# Patient Record
Sex: FEMALE | Race: WHITE | NOT HISPANIC OR LATINO
[De-identification: names, ages, dates, MRNs, and addresses within clinical notes are randomized per-mention and may not be internally consistent; named-entity substitution may affect disease eponyms.]

---

## 2018-07-02 ENCOUNTER — TRANSCRIPTION ENCOUNTER (OUTPATIENT)
Age: 53
End: 2018-07-02

## 2020-08-05 VITALS — BODY MASS INDEX: 26.68 KG/M2 | WEIGHT: 170 LBS | HEIGHT: 67 IN

## 2020-08-05 PROBLEM — Z00.00 ENCOUNTER FOR PREVENTIVE HEALTH EXAMINATION: Status: ACTIVE | Noted: 2020-08-05

## 2020-08-05 NOTE — HISTORY OF PRESENT ILLNESS
[TextBox_13] : Referred by Rose Mary Anderson NP.\par \par Ms. BARNEY is a 55 year old female with a history of squamous cell skin CA of chest.\par \par She was called today to review eligibility for Low-Dose CT lung cancer screening.  Reviewed and confirmed that the patient meets screening eligibility criteria:\par \par 55 years old \par \par Smoking Status: Former smoker\par \par Number of pack(s) per day: 18 cigarettes daily\par Number of years smoked: 36\par Number of pack years smokin.5\par \par Ms. BARNEY denies any symptoms of lung cancer, including new cough, change in cough, hemoptysis, and unintentional weight loss.\par \par Ms. BARNEY denies any personal history of lung cancer.  No lung cancer in a first degree relative.  Denies any history of lung disease or any history of occupational exposures.

## 2020-08-05 NOTE — PLAN
[[facility]] : referred to [place] [facility] [phone] [FreeTextEntry1] : - Low Dose CT chest for lung cancer screening.\par \par - Encouraged smoking cessation\par \par

## 2020-08-12 ENCOUNTER — APPOINTMENT (OUTPATIENT)
Dept: CT IMAGING | Facility: CLINIC | Age: 55
End: 2020-08-12
Payer: COMMERCIAL

## 2020-08-12 PROCEDURE — G0297: CPT

## 2020-08-14 ENCOUNTER — APPOINTMENT (OUTPATIENT)
Dept: RADIOLOGY | Facility: CLINIC | Age: 55
End: 2020-08-14
Payer: COMMERCIAL

## 2020-08-14 PROCEDURE — 77080 DXA BONE DENSITY AXIAL: CPT

## 2020-08-15 ENCOUNTER — TRANSCRIPTION ENCOUNTER (OUTPATIENT)
Age: 55
End: 2020-08-15

## 2021-08-11 ENCOUNTER — APPOINTMENT (OUTPATIENT)
Dept: CARDIOLOGY | Facility: CLINIC | Age: 56
End: 2021-08-11

## 2021-08-17 ENCOUNTER — OUTPATIENT (OUTPATIENT)
Dept: OUTPATIENT SERVICES | Facility: HOSPITAL | Age: 56
LOS: 1 days | End: 2021-08-17
Payer: COMMERCIAL

## 2021-08-17 DIAGNOSIS — R79.89 OTHER SPECIFIED ABNORMAL FINDINGS OF BLOOD CHEMISTRY: ICD-10-CM

## 2021-08-18 ENCOUNTER — NON-APPOINTMENT (OUTPATIENT)
Age: 56
End: 2021-08-18

## 2021-08-18 ENCOUNTER — RESULT REVIEW (OUTPATIENT)
Age: 56
End: 2021-08-18

## 2021-08-18 ENCOUNTER — OUTPATIENT (OUTPATIENT)
Dept: OUTPATIENT SERVICES | Facility: HOSPITAL | Age: 56
LOS: 1 days | End: 2021-08-18

## 2021-08-18 ENCOUNTER — APPOINTMENT (OUTPATIENT)
Dept: HEMATOLOGY ONCOLOGY | Facility: CLINIC | Age: 56
End: 2021-08-18
Payer: COMMERCIAL

## 2021-08-18 ENCOUNTER — OUTPATIENT (OUTPATIENT)
Dept: OUTPATIENT SERVICES | Facility: HOSPITAL | Age: 56
LOS: 1 days | End: 2021-08-18
Payer: COMMERCIAL

## 2021-08-18 VITALS
HEART RATE: 86 BPM | WEIGHT: 174 LBS | OXYGEN SATURATION: 97 % | HEIGHT: 66 IN | DIASTOLIC BLOOD PRESSURE: 83 MMHG | TEMPERATURE: 98.2 F | BODY MASS INDEX: 27.97 KG/M2 | SYSTOLIC BLOOD PRESSURE: 119 MMHG

## 2021-08-18 DIAGNOSIS — R91.1 SOLITARY PULMONARY NODULE: ICD-10-CM

## 2021-08-18 DIAGNOSIS — R79.89 OTHER SPECIFIED ABNORMAL FINDINGS OF BLOOD CHEMISTRY: ICD-10-CM

## 2021-08-18 DIAGNOSIS — Z78.9 OTHER SPECIFIED HEALTH STATUS: ICD-10-CM

## 2021-08-18 DIAGNOSIS — M85.80 OTHER SPECIFIED DISORDERS OF BONE DENSITY AND STRUCTURE, UNSPECIFIED SITE: ICD-10-CM

## 2021-08-18 DIAGNOSIS — E53.8 DEFICIENCY OF OTHER SPECIFIED B GROUP VITAMINS: ICD-10-CM

## 2021-08-18 DIAGNOSIS — D58.2 OTHER HEMOGLOBINOPATHIES: ICD-10-CM

## 2021-08-18 DIAGNOSIS — Z80.51 FAMILY HISTORY OF MALIGNANT NEOPLASM OF KIDNEY: ICD-10-CM

## 2021-08-18 DIAGNOSIS — C44.92 SQUAMOUS CELL CARCINOMA OF SKIN, UNSPECIFIED: ICD-10-CM

## 2021-08-18 DIAGNOSIS — Z86.79 PERSONAL HISTORY OF OTHER DISEASES OF THE CIRCULATORY SYSTEM: ICD-10-CM

## 2021-08-18 LAB
BASOPHILS # BLD AUTO: 0.05 K/UL — SIGNIFICANT CHANGE UP (ref 0–0.2)
BASOPHILS NFR BLD AUTO: 0.6 % — SIGNIFICANT CHANGE UP (ref 0–2)
EOSINOPHIL # BLD AUTO: 0.08 K/UL — SIGNIFICANT CHANGE UP (ref 0–0.5)
EOSINOPHIL NFR BLD AUTO: 1 % — SIGNIFICANT CHANGE UP (ref 0–6)
HCT VFR BLD CALC: 48.5 % — HIGH (ref 34.5–45)
HGB BLD-MCNC: 16.3 G/DL — HIGH (ref 11.5–15.5)
IMM GRANULOCYTES NFR BLD AUTO: 0.1 % — SIGNIFICANT CHANGE UP (ref 0–1.5)
LYMPHOCYTES # BLD AUTO: 2.64 K/UL — SIGNIFICANT CHANGE UP (ref 1–3.3)
LYMPHOCYTES # BLD AUTO: 33.1 % — SIGNIFICANT CHANGE UP (ref 13–44)
MCHC RBC-ENTMCNC: 33.2 PG — SIGNIFICANT CHANGE UP (ref 27–34)
MCHC RBC-ENTMCNC: 33.6 GM/DL — SIGNIFICANT CHANGE UP (ref 32–36)
MCV RBC AUTO: 98.8 FL — SIGNIFICANT CHANGE UP (ref 80–100)
MONOCYTES # BLD AUTO: 0.56 K/UL — SIGNIFICANT CHANGE UP (ref 0–0.9)
MONOCYTES NFR BLD AUTO: 7 % — SIGNIFICANT CHANGE UP (ref 2–14)
NEUTROPHILS # BLD AUTO: 4.63 K/UL — SIGNIFICANT CHANGE UP (ref 1.8–7.4)
NEUTROPHILS NFR BLD AUTO: 58.2 % — SIGNIFICANT CHANGE UP (ref 43–77)
NRBC # BLD: 0 /100 WBCS — SIGNIFICANT CHANGE UP (ref 0–0)
PLATELET # BLD AUTO: 199 K/UL — SIGNIFICANT CHANGE UP (ref 150–400)
RBC # BLD: 4.91 M/UL — SIGNIFICANT CHANGE UP (ref 3.8–5.2)
RBC # FLD: 12.7 % — SIGNIFICANT CHANGE UP (ref 10.3–14.5)
WBC # BLD: 7.97 K/UL — SIGNIFICANT CHANGE UP (ref 3.8–10.5)
WBC # FLD AUTO: 7.97 K/UL — SIGNIFICANT CHANGE UP (ref 3.8–10.5)

## 2021-08-18 PROCEDURE — 85027 COMPLETE CBC AUTOMATED: CPT

## 2021-08-18 PROCEDURE — 82668 ASSAY OF ERYTHROPOIETIN: CPT

## 2021-08-18 PROCEDURE — 81270 JAK2 GENE: CPT

## 2021-08-18 PROCEDURE — 99203 OFFICE O/P NEW LOW 30 MIN: CPT

## 2021-08-18 PROCEDURE — 83930 ASSAY OF BLOOD OSMOLALITY: CPT

## 2021-08-18 PROCEDURE — G0452: CPT | Mod: 26

## 2021-08-18 RX ORDER — OMEGA-3/DHA/EPA/FISH OIL 300-1000MG
CAPSULE ORAL
Refills: 0 | Status: ACTIVE | COMMUNITY

## 2021-08-19 ENCOUNTER — NON-APPOINTMENT (OUTPATIENT)
Age: 56
End: 2021-08-19

## 2021-08-19 LAB
EPO SERPL-MCNC: 9.5 MIU/ML — SIGNIFICANT CHANGE UP (ref 2.6–18.5)
OSMOLALITY SERPL: 288 MOSMOL/KG — SIGNIFICANT CHANGE UP (ref 275–300)

## 2021-08-20 ENCOUNTER — NON-APPOINTMENT (OUTPATIENT)
Age: 56
End: 2021-08-20

## 2021-08-20 LAB — JAK2 P.V617F BLD/T QL: SIGNIFICANT CHANGE UP

## 2021-08-23 ENCOUNTER — NON-APPOINTMENT (OUTPATIENT)
Age: 56
End: 2021-08-23

## 2021-08-23 ENCOUNTER — APPOINTMENT (OUTPATIENT)
Dept: CARDIOLOGY | Facility: CLINIC | Age: 56
End: 2021-08-23
Payer: COMMERCIAL

## 2021-08-23 VITALS — SYSTOLIC BLOOD PRESSURE: 134 MMHG | DIASTOLIC BLOOD PRESSURE: 84 MMHG

## 2021-08-23 VITALS
OXYGEN SATURATION: 99 % | WEIGHT: 173 LBS | HEART RATE: 92 BPM | BODY MASS INDEX: 27.8 KG/M2 | SYSTOLIC BLOOD PRESSURE: 136 MMHG | DIASTOLIC BLOOD PRESSURE: 84 MMHG | HEIGHT: 66 IN | TEMPERATURE: 98.4 F

## 2021-08-23 PROCEDURE — 99244 OFF/OP CNSLTJ NEW/EST MOD 40: CPT

## 2021-08-23 PROCEDURE — 93000 ELECTROCARDIOGRAM COMPLETE: CPT

## 2021-08-25 ENCOUNTER — APPOINTMENT (OUTPATIENT)
Dept: ULTRASOUND IMAGING | Facility: CLINIC | Age: 56
End: 2021-08-25
Payer: COMMERCIAL

## 2021-08-25 PROCEDURE — 76700 US EXAM ABDOM COMPLETE: CPT

## 2021-08-28 NOTE — ASSESSMENT
[FreeTextEntry1] : Smoking cessation has been emphasized \par echocardiogram to evaluate for hypertensive heart disease \par when we obtain blood pressure goals will perform exercise test  \par add amlodipine \par take lisinopril in the morning amlodipine in the evening \par return with blood pressure monitorand log low-sodium diet \par \par Discussed the diagnosis, natural history and pathophysiology of HTN.\par Discussed importance of long term BP control to reduce CV risk\par Reviewed recent guidelines and BP goals\par Echocardiogram to evaluate for hypertensive heart disease\par Stress test to evaluate CV response to exercise\par Discussed indications for pharmacotherapy.\par Instructed to call if adverse effects\par Lifestyle measures.\par Adjunctive dietary measures: regular aerobic exercise, low-sodium diet, limit NSAIDs\par Monitor blood pressure at home.   Return with blood pressure monitor and log.  Call if abnormal BP readings\par Bloodwork requested\par \par Note: Findings of cardiac involvement in hemochromatosis would be initial impairment of diastolic dysfunction with atrial enlargement then possible hypertrophy. Leads to a dilated cardiomyopathy characterized by the development of heart failure and conduction disturbances due to excess deposition of iron within the myocardium which can be detected by cardiovascular magnetic resonance. Treatment with phlebotomy or chelation therapy has been associated with reversal of the left ventricular dysfunction.\par

## 2021-08-28 NOTE — HISTORY OF PRESENT ILLNESS
[FreeTextEntry1] : ALLAN BARNEY  is a 56 year old  F\par \par there is a history of a heart murmur \par she has an allergy to sulfa \par she recently saw her primary physician \par she was noted to have elevated blood pressure \par she reports chronically having a history of asymptomatic hypertension \par she was started on lisinopril the dose was subsequently increased \par she smokes up to a pack a day \par she is interested in quitting \par she walks \par her family history is notable for father with cranial aneurysm \par she has started a plant-based diet\par \par There is no prior history of a clinical myocardial infarction, coronary revascularization. \par There is no history of symptomatic congestive heart failure rheumatic heart disease or valvular disease.\par There is no history of symptomatic arrhythmias including atrial fibrillation.\par \par There is no exertional chest pain, pressure or discomfort. \par There is no significant dyspnea on exertion or orthopnea. \par There are no symptomatic palpitations, lightheadedness, dizziness or syncope.\par \par

## 2021-09-02 ENCOUNTER — APPOINTMENT (OUTPATIENT)
Dept: CARDIOLOGY | Facility: CLINIC | Age: 56
End: 2021-09-02
Payer: COMMERCIAL

## 2021-09-02 PROBLEM — D58.2 ELEVATED HEMOGLOBIN: Status: ACTIVE | Noted: 2021-08-18

## 2021-09-02 PROCEDURE — 93356 MYOCRD STRAIN IMG SPCKL TRCK: CPT | Mod: 26

## 2021-09-02 PROCEDURE — 93306 TTE W/DOPPLER COMPLETE: CPT

## 2021-09-02 NOTE — REVIEW OF SYSTEMS
[Patient Intake Form Reviewed] : Patient intake form was reviewed [Fever] : no fever [Fatigue] : no fatigue [Recent Change In Weight] : ~T no recent weight change [Dysphagia] : no dysphagia [Odynophagia] : no odynophagia [Palpitations] : palpitations [Shortness Of Breath] : no shortness of breath [Abdominal Pain] : no abdominal pain [Joint Pain] : no joint pain [Joint Stiffness] : no joint stiffness [Skin Rash] : no skin rash [Fainting] : no fainting [Easy Bleeding] : no tendency for easy bleeding [Easy Bruising] : no tendency for easy bruising [Swollen Glands] : no swollen glands

## 2021-09-02 NOTE — RESULTS/DATA
[FreeTextEntry1] : Ms. 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

## 2021-09-02 NOTE — PHYSICAL EXAM
[Fully active, able to carry on all pre-disease performance without restriction] : Status 0 - Fully active, able to carry on all pre-disease performance without restriction [Normal] : affect appropriate [de-identified] : anicteric

## 2021-09-02 NOTE — CONSULT LETTER
[Dear  ___] : Dear  [unfilled], [Consult Letter:] : I had the pleasure of evaluating your patient, [unfilled]. [Please see my note below.] : Please see my note below. [Consult Closing:] : Thank you very much for allowing me to participate in the care of this patient.  If you have any questions, please do not hesitate to contact me. [Sincerely,] : Sincerely, [FreeTextEntry2] : Dr. Shawna Anthony [FreeTextEntry3] : Frank Yepez MD\par

## 2021-09-07 ENCOUNTER — APPOINTMENT (OUTPATIENT)
Dept: CARDIOLOGY | Facility: CLINIC | Age: 56
End: 2021-09-07
Payer: COMMERCIAL

## 2021-09-07 VITALS
TEMPERATURE: 97.7 F | BODY MASS INDEX: 27.97 KG/M2 | SYSTOLIC BLOOD PRESSURE: 110 MMHG | WEIGHT: 174 LBS | DIASTOLIC BLOOD PRESSURE: 80 MMHG | OXYGEN SATURATION: 97 % | HEART RATE: 84 BPM | HEIGHT: 66 IN

## 2021-09-07 DIAGNOSIS — F17.210 NICOTINE DEPENDENCE, CIGARETTES, UNCOMPLICATED: ICD-10-CM

## 2021-09-07 PROCEDURE — 99214 OFFICE O/P EST MOD 30 MIN: CPT

## 2021-09-07 NOTE — CARDIOLOGY SUMMARY
[de-identified] : 8/23/21 normal sinus rhythm with low voltage\par  [de-identified] : 9/2/21 EF 60-65%, mild MR, normal LV systolic function, normal PASP\par

## 2021-09-07 NOTE — ASSESSMENT
[FreeTextEntry1] : ALLAN BARNEY is a 56 year old F who presents today Sep 07, 2021 here to review cardiovascular test results. and BP management. \par \par HTN\par Discussed the diagnosis, natural history and pathophysiology of HTN.\par Discussed importance of long term BP control to reduce CV risk\par Echo shows no evidence of hypertensive heart disease. BP has much improved on current regimen. \par Cont to take lisinopril in the morning amlodipine in the evening \par Stress test to evaluate CV response to exercise\par Lifestyle measures.\par Adjunctive dietary measures: regular aerobic exercise, low-sodium diet, limit NSAIDs\par Monitor blood pressure at home.   Return with blood pressure monitor and log.  Call if abnormal BP readings\par \par Smoking cessation has been emphasized. Motivated to quit. Has received info from cessation counseling. \par \par Note: Findings of cardiac involvement in hemochromatosis would be initial impairment of diastolic dysfunction with atrial enlargement then possible hypertrophy. Leads to a dilated cardiomyopathy characterized by the development of heart failure and conduction disturbances due to excess deposition of iron within the myocardium which can be detected by cardiovascular magnetic resonance. Treatment with phlebotomy or chelation therapy has been associated with reversal of the left ventricular dysfunction.\par Cont to followup with hematology. \par \par Will call w/ stress test results and further recommendations. \par Any questions and concerns were addressed and resolved. \par \par Sincerely,\par \par AYAZ Silver\par Patients history, testing, and plan reviewed with supervising MD: Dr. Freddy Washington

## 2021-09-07 NOTE — ADDENDUM
[FreeTextEntry1] : Please note the patient was reviewed with NP Tracye Woods.\lexus I was physically present during the service of the patient.\lexus I was directly involved in the management plan and recommendations of the care provided to the patient. \par I personally reviewed the history and physical examination as documented by the NP above.\par Sep  7 2021 10:30AM\par

## 2021-09-07 NOTE — HISTORY OF PRESENT ILLNESS
[FreeTextEntry1] : ALLAN BARNEY  is a 56 year old  F here to review cardiovascular test results. \par \par there is a history of a heart murmur \par she has an allergy to sulfa \par she recently saw her primary physician \par she was noted to have elevated blood pressure \par she reports chronically having a history of asymptomatic hypertension \par she was started on lisinopril the dose was subsequently increased \par she smokes up to a pack a day \par she is interested in quitting \par she walks up to 40 min \par her family history is notable for father with cranial aneurysm \par she has started a plant-based diet\par \par There is no prior history of a clinical myocardial infarction, coronary revascularization. \par There is no history of symptomatic congestive heart failure rheumatic heart disease or valvular disease.\par There is no history of symptomatic arrhythmias including atrial fibrillation.\par \par There is no exertional chest pain, pressure or discomfort. \par There is no significant dyspnea on exertion or orthopnea. \par There are no symptomatic palpitations, lightheadedness, dizziness or syncope.\par \par Since last seen now on amlodipine takes 5mg at night. Takes lisinopril 20mg in morning. \par Home cuff reads a bit too high, with that in mind her readings have since been very well controlled. \par Today 110/80.\par No adverse effect noted. \par \par Labs , HDL 65\par \par

## 2021-09-14 ENCOUNTER — APPOINTMENT (OUTPATIENT)
Dept: DISASTER EMERGENCY | Facility: CLINIC | Age: 56
End: 2021-09-14

## 2021-09-14 DIAGNOSIS — Z01.818 ENCOUNTER FOR OTHER PREPROCEDURAL EXAMINATION: ICD-10-CM

## 2021-09-15 LAB — SARS-COV-2 N GENE NPH QL NAA+PROBE: NOT DETECTED

## 2021-09-17 ENCOUNTER — OUTPATIENT (OUTPATIENT)
Dept: OUTPATIENT SERVICES | Facility: HOSPITAL | Age: 56
LOS: 1 days | End: 2021-09-17

## 2021-09-30 ENCOUNTER — APPOINTMENT (OUTPATIENT)
Dept: CARDIOLOGY | Facility: CLINIC | Age: 56
End: 2021-09-30
Payer: COMMERCIAL

## 2021-09-30 PROCEDURE — 93242 EXT ECG>48HR<7D RECORDING: CPT

## 2021-09-30 PROCEDURE — 93015 CV STRESS TEST SUPVJ I&R: CPT

## 2021-10-28 ENCOUNTER — OUTPATIENT (OUTPATIENT)
Dept: OUTPATIENT SERVICES | Facility: HOSPITAL | Age: 56
LOS: 1 days | End: 2021-10-28

## 2021-10-28 DIAGNOSIS — R79.89 OTHER SPECIFIED ABNORMAL FINDINGS OF BLOOD CHEMISTRY: ICD-10-CM

## 2021-10-29 ENCOUNTER — APPOINTMENT (OUTPATIENT)
Dept: HEMATOLOGY ONCOLOGY | Facility: CLINIC | Age: 56
End: 2021-10-29

## 2021-11-05 PROCEDURE — 93244 EXT ECG>48HR<7D REV&INTERPJ: CPT

## 2021-11-12 ENCOUNTER — TRANSCRIPTION ENCOUNTER (OUTPATIENT)
Age: 56
End: 2021-11-12

## 2021-11-16 ENCOUNTER — APPOINTMENT (OUTPATIENT)
Dept: CARDIOLOGY | Facility: CLINIC | Age: 56
End: 2021-11-16
Payer: COMMERCIAL

## 2021-11-16 VITALS
HEART RATE: 99 BPM | SYSTOLIC BLOOD PRESSURE: 100 MMHG | BODY MASS INDEX: 28.28 KG/M2 | HEIGHT: 66 IN | WEIGHT: 176 LBS | DIASTOLIC BLOOD PRESSURE: 70 MMHG | OXYGEN SATURATION: 98 % | TEMPERATURE: 97.3 F

## 2021-11-16 DIAGNOSIS — Z14.8 GENETIC CARRIER OF OTHER DISEASE: ICD-10-CM

## 2021-11-16 DIAGNOSIS — I49.3 VENTRICULAR PREMATURE DEPOLARIZATION: ICD-10-CM

## 2021-11-16 PROCEDURE — 99214 OFFICE O/P EST MOD 30 MIN: CPT

## 2021-11-16 NOTE — ADDENDUM
[FreeTextEntry1] : Please note the patient was reviewed with NP Tracey Woods.\par I was physically present during the service of the patient.\lexus I was directly involved in the management plan and recommendations of the care provided to the patient. \par I personally reviewed the history and physical examination as documented by the NP above.\par Nov 16 2021 12:30PM\par

## 2021-11-16 NOTE — ASSESSMENT
[FreeTextEntry1] : ALLAN BARNEY is a 56 year old F who presents today Nov 16, 2021 here to review cardiovascular test results. \par \par HTN\par Echo shows no evidence of hypertensive heart disease. BP has much improved on current regimen. \par Cont to take lisinopril in the morning amlodipine in the evening - refilled today. \par Stress test shows normal CV response to exercise.\par Lifestyle measures.\par Adjunctive dietary measures: regular aerobic exercise, low-sodium diet, limit NSAIDs\par Monitor blood pressure at home, keep log. Call if abnormal BP readings\par \par Smoking cessation has been emphasized. Motivated to quit. Has received info from cessation counseling. \par \par Note: Findings of cardiac involvement in hemochromatosis would be initial impairment of diastolic dysfunction with atrial enlargement then possible hypertrophy. Leads to a dilated cardiomyopathy characterized by the development of heart failure and conduction disturbances due to excess deposition of iron within the myocardium which can be detected by cardiovascular magnetic resonance. Treatment with phlebotomy or chelation therapy has been associated with reversal of the left ventricular dysfunction.\par Cont to followup with hematology. \par \par PVCs, rare benign ectopy on monitor. \par One PVC triplet during stress test. No symptom. Normal EF. No ischemia. \par Recommend reduce stimulants, increase hydration. If any symptomatic increase would consider altering antihypertensive regimen to allow for beta blocker. For now cont current course with conservative management. \par \par Any questions and concerns were addressed and resolved. \par \par Sincerely,\par \par AYAZ Silver\par Patients history, testing, and plan reviewed with supervising MD: Dr. Freddy Washington

## 2021-11-16 NOTE — CARDIOLOGY SUMMARY
[de-identified] : 8/23/21 normal sinus rhythm with low voltage\par  [de-identified] : 3 day monitor after ETT showed overall normal sinus rhythm avg 90s and rare ectopy. \par  [de-identified] : 9/30/21 6 min 10 sec robert protocol no symptoms. Normal CV response. No ischemia by EKG. Rare PVCs and one PVC triplet at peak. No symptom. \par  [de-identified] : 9/2/21 EF 60-65%, mild MR, normal LV systolic function, normal PASP\par

## 2021-11-16 NOTE — HISTORY OF PRESENT ILLNESS
[FreeTextEntry1] : ALLAN BARNEY  is a 56 year old  F here to review cardiovascular test results. \par \par there is a history of a heart murmur \par she has an allergy to sulfa \par she recently saw her primary physician \par she was noted to have elevated blood pressure \par she reports chronically having a history of asymptomatic hypertension \par she was started on lisinopril the dose was subsequently increased \par she smokes up to a pack a day \par she is interested in quitting \par she walks up to 40 min \par her family history is notable for father with cranial aneurysm \par she has started a plant-based diet\par \par There is no prior history of a clinical myocardial infarction, coronary revascularization. \par There is no history of symptomatic congestive heart failure rheumatic heart disease or valvular disease.\par There is no history of symptomatic arrhythmias including atrial fibrillation.\par \par There is no exertional chest pain, pressure or discomfort. \par There is no significant dyspnea on exertion or orthopnea. \par There are no symptomatic palpitations, lightheadedness, dizziness or syncope.\par \par Since last seen now on amlodipine takes 5mg at night. Takes lisinopril 20mg in morning. \par Home cuff reads a bit too high, with that in mind her readings have since been very well controlled. \par Today 100/70 no dizziness noted. No adverse effect noted. \par \par Labs July 2021 , HDL 65, k 4.5, creat 0.73\par \par

## 2023-01-17 ENCOUNTER — RX RENEWAL (OUTPATIENT)
Age: 58
End: 2023-01-17

## 2023-02-16 ENCOUNTER — NON-APPOINTMENT (OUTPATIENT)
Age: 58
End: 2023-02-16

## 2023-02-23 RX ORDER — LISINOPRIL 20 MG/1
20 TABLET ORAL DAILY
Qty: 30 | Refills: 0 | Status: ACTIVE | COMMUNITY
Start: 1900-01-01 | End: 1900-01-01

## 2023-03-22 ENCOUNTER — NON-APPOINTMENT (OUTPATIENT)
Age: 58
End: 2023-03-22

## 2023-03-22 ENCOUNTER — APPOINTMENT (OUTPATIENT)
Dept: CARDIOLOGY | Facility: CLINIC | Age: 58
End: 2023-03-22
Payer: COMMERCIAL

## 2023-03-22 VITALS
TEMPERATURE: 98.2 F | HEIGHT: 66 IN | OXYGEN SATURATION: 98 % | SYSTOLIC BLOOD PRESSURE: 116 MMHG | HEART RATE: 83 BPM | BODY MASS INDEX: 28.61 KG/M2 | WEIGHT: 178 LBS | DIASTOLIC BLOOD PRESSURE: 74 MMHG

## 2023-03-22 PROCEDURE — 93000 ELECTROCARDIOGRAM COMPLETE: CPT

## 2023-03-22 PROCEDURE — 99214 OFFICE O/P EST MOD 30 MIN: CPT | Mod: 25

## 2023-03-23 NOTE — HISTORY OF PRESENT ILLNESS
[FreeTextEntry1] : ALLAN BARNEY  is a 58 year old  F\par \par history of a heart murmur, HTN, tob use \par \par There is no prior history of a clinical myocardial infarction, coronary revascularization. \par There is no history of symptomatic congestive heart failure rheumatic heart disease\par There is no history of symptomatic arrhythmias including atrial fibrillation.\par \par There is no exertional chest pain, pressure or discomfort. \par There is no significant dyspnea on exertion or orthopnea. \par There are no symptomatic palpitations, lightheadedness, dizziness or syncope.\par \par She has had symptoms of headache.  She saw her primary physician.  Home readings are typically 120s to 140s over 80s to 90s. \par On repeat examination her blood pressure is similar to her readings at home. \par She reports previously having a mild rash with sulfa.\par \par EKG demonstrates normal sinus rhythm with low voltage. \par Prior TSH 1.2 potassium 4.5 creatinine 0.7  \par prior echocardiogram has normal left ventricular function with borderline left ventricular hypertrophy mild tricuspid regurgitation \par prior exercise test is notable for ventricular ectopy \par

## 2023-04-04 ENCOUNTER — NON-APPOINTMENT (OUTPATIENT)
Age: 58
End: 2023-04-04

## 2023-05-17 ENCOUNTER — APPOINTMENT (OUTPATIENT)
Dept: CARDIOLOGY | Facility: CLINIC | Age: 58
End: 2023-05-17
Payer: COMMERCIAL

## 2023-05-17 VITALS
DIASTOLIC BLOOD PRESSURE: 78 MMHG | HEART RATE: 95 BPM | SYSTOLIC BLOOD PRESSURE: 118 MMHG | BODY MASS INDEX: 26.68 KG/M2 | OXYGEN SATURATION: 96 % | WEIGHT: 170 LBS | HEIGHT: 67 IN

## 2023-05-17 DIAGNOSIS — Z72.0 TOBACCO USE: ICD-10-CM

## 2023-05-17 DIAGNOSIS — I10 ESSENTIAL (PRIMARY) HYPERTENSION: ICD-10-CM

## 2023-05-17 DIAGNOSIS — R01.1 CARDIAC MURMUR, UNSPECIFIED: ICD-10-CM

## 2023-05-17 PROCEDURE — 93306 TTE W/DOPPLER COMPLETE: CPT

## 2023-05-17 PROCEDURE — 99214 OFFICE O/P EST MOD 30 MIN: CPT

## 2023-05-17 PROCEDURE — 93880 EXTRACRANIAL BILAT STUDY: CPT

## 2023-05-17 NOTE — HISTORY OF PRESENT ILLNESS
[FreeTextEntry1] : ALLAN BARNEY  is a 58 year F  who presents today May 17, 2023 for echo, carotid and clinical follow-up to review. \par Overall he has been feeling well. There has been no recent illness or hospital stay. Medications have remained unchanged. Asymptomatic from cardiovascular and arrhythmia standpoint. \par Working with the tobacco dependence program.\par \par Today she denies chest pain, pressure, unusual shortness of breath, lightheadedness, dizziness, near syncope or syncope.\par  \par History of a heart murmur, HTN, tob use \par \par There is no prior history of a clinical myocardial infarction, coronary revascularization. \par There is no history of symptomatic congestive heart failure rheumatic heart disease\par There is no history of symptomatic arrhythmias including atrial fibrillation.\par \par There is no exertional chest pain, pressure or discomfort. \par There is no significant dyspnea on exertion or orthopnea. \par There are no symptomatic palpitations, lightheadedness, dizziness or syncope.\par \par She has had symptoms of headache.  She saw her primary physician.  Home readings are typically 120s to 140s over 80s to 90s. \par She reports previously having a mild rash with sulfa.\par \par EKG demonstrates normal sinus rhythm with low voltage. \par Prior TSH 1.2 potassium 4.5 creatinine 0.7  \par prior echocardiogram has normal left ventricular function with borderline left ventricular hypertrophy mild tricuspid regurgitation \par prior exercise test is notable for ventricular ectopy \par

## 2023-05-17 NOTE — CARDIOLOGY SUMMARY
[de-identified] : Echo 5/17/2023 EF 60-65% - no significant change since 2021 [de-identified] : Carotid US 5/17/2023 mild non-obstructive disease

## 2023-05-17 NOTE — ASSESSMENT
[FreeTextEntry1] : ALLAN BARNEY  is a 58 year F  who presents today May 17, 2023 with the above history and the following active issues. \par \par Hypertension. Blood pressure well controlled on my assessment. Continue losartan hydrochlorothiazide.  \par Echocardiogram with normal LVEF and no significant change since 2021. \par Low-sodium diet.\par \par Smoking cessation has been emphasized.  \par Continues to work with the tobacco dependence program.\par \par Risk factors for CAD including long history of smoking, and HTN,  Recommend ETT to assess HR/BP response to exercise and ischemic eval with EKG response. \par Depending on ETT results we will consider CT calcium score vs CTA coronary arteries. \par \par Red flag symptoms which would warrant sooner emergent evaluation reviewed with the patient. \par Questions and concerns were addressed and answered. \par \par Sincerely,\par \par Maren Negrete PA-C\par Patients history, testing and plan reviewed with supervising MD: Dr. Antonina Crespo

## 2023-06-15 ENCOUNTER — RX RENEWAL (OUTPATIENT)
Age: 58
End: 2023-06-15

## 2024-04-25 ENCOUNTER — RX RENEWAL (OUTPATIENT)
Age: 59
End: 2024-04-25

## 2024-04-25 RX ORDER — LOSARTAN POTASSIUM AND HYDROCHLOROTHIAZIDE 12.5; 5 MG/1; MG/1
50-12.5 TABLET ORAL
Qty: 90 | Refills: 0 | Status: ACTIVE | COMMUNITY
Start: 2023-03-22 | End: 1900-01-01

## 2024-06-10 ENCOUNTER — RX RENEWAL (OUTPATIENT)
Age: 59
End: 2024-06-10

## 2024-06-10 RX ORDER — AMLODIPINE BESYLATE 5 MG/1
5 TABLET ORAL DAILY
Qty: 30 | Refills: 0 | Status: ACTIVE | COMMUNITY
Start: 2021-08-23 | End: 1900-01-01

## 2025-02-05 ENCOUNTER — APPOINTMENT (OUTPATIENT)
Dept: CARDIOLOGY | Facility: CLINIC | Age: 60
End: 2025-02-05
Payer: COMMERCIAL

## 2025-02-05 ENCOUNTER — NON-APPOINTMENT (OUTPATIENT)
Age: 60
End: 2025-02-05

## 2025-02-05 VITALS
HEART RATE: 105 BPM | HEIGHT: 67 IN | WEIGHT: 162 LBS | OXYGEN SATURATION: 98 % | DIASTOLIC BLOOD PRESSURE: 64 MMHG | BODY MASS INDEX: 25.43 KG/M2 | SYSTOLIC BLOOD PRESSURE: 100 MMHG

## 2025-02-05 DIAGNOSIS — Z72.0 TOBACCO USE: ICD-10-CM

## 2025-02-05 DIAGNOSIS — I10 ESSENTIAL (PRIMARY) HYPERTENSION: ICD-10-CM

## 2025-02-05 PROCEDURE — 99214 OFFICE O/P EST MOD 30 MIN: CPT

## 2025-02-05 PROCEDURE — 93000 ELECTROCARDIOGRAM COMPLETE: CPT

## 2025-03-06 ENCOUNTER — APPOINTMENT (OUTPATIENT)
Dept: MRI IMAGING | Facility: CLINIC | Age: 60
End: 2025-03-06
Payer: COMMERCIAL

## 2025-03-06 PROCEDURE — 73721 MRI JNT OF LWR EXTRE W/O DYE: CPT | Mod: LT

## 2025-03-31 ENCOUNTER — APPOINTMENT (OUTPATIENT)
Dept: CARDIOLOGY | Facility: CLINIC | Age: 60
End: 2025-03-31
Payer: COMMERCIAL

## 2025-03-31 DIAGNOSIS — I49.3 VENTRICULAR PREMATURE DEPOLARIZATION: ICD-10-CM

## 2025-03-31 PROCEDURE — 93015 CV STRESS TEST SUPVJ I&R: CPT

## 2025-03-31 PROCEDURE — 93978 VASCULAR STUDY: CPT

## 2025-04-02 ENCOUNTER — APPOINTMENT (OUTPATIENT)
Dept: CARDIOLOGY | Facility: CLINIC | Age: 60
End: 2025-04-02
Payer: COMMERCIAL

## 2025-04-02 VITALS
DIASTOLIC BLOOD PRESSURE: 74 MMHG | HEART RATE: 70 BPM | HEIGHT: 67 IN | BODY MASS INDEX: 24.33 KG/M2 | OXYGEN SATURATION: 96 % | SYSTOLIC BLOOD PRESSURE: 106 MMHG | WEIGHT: 155 LBS

## 2025-04-02 DIAGNOSIS — I10 ESSENTIAL (PRIMARY) HYPERTENSION: ICD-10-CM

## 2025-04-02 DIAGNOSIS — I65.29 OCCLUSION AND STENOSIS OF UNSPECIFIED CAROTID ARTERY: ICD-10-CM

## 2025-04-02 DIAGNOSIS — Z71.89 OTHER SPECIFIED COUNSELING: ICD-10-CM

## 2025-04-02 DIAGNOSIS — Z72.0 TOBACCO USE: ICD-10-CM

## 2025-04-02 PROCEDURE — 99214 OFFICE O/P EST MOD 30 MIN: CPT

## 2025-04-02 RX ORDER — MELOXICAM 15 MG/1
TABLET ORAL DAILY
Refills: 0 | Status: ACTIVE | COMMUNITY

## 2025-07-25 ENCOUNTER — APPOINTMENT (OUTPATIENT)
Dept: MRI IMAGING | Facility: CLINIC | Age: 60
End: 2025-07-25
Payer: COMMERCIAL

## 2025-07-25 PROCEDURE — 73721 MRI JNT OF LWR EXTRE W/O DYE: CPT | Mod: RT
